# Patient Record
Sex: FEMALE | Race: BLACK OR AFRICAN AMERICAN
[De-identification: names, ages, dates, MRNs, and addresses within clinical notes are randomized per-mention and may not be internally consistent; named-entity substitution may affect disease eponyms.]

---

## 2020-08-27 ENCOUNTER — APPOINTMENT (OUTPATIENT)
Dept: COLORECTAL SURGERY | Facility: CLINIC | Age: 60
End: 2020-08-27
Payer: MEDICAID

## 2020-08-27 VITALS
DIASTOLIC BLOOD PRESSURE: 63 MMHG | SYSTOLIC BLOOD PRESSURE: 92 MMHG | HEART RATE: 80 BPM | TEMPERATURE: 96.9 F | BODY MASS INDEX: 28.58 KG/M2 | WEIGHT: 193 LBS | HEIGHT: 69 IN

## 2020-08-27 VITALS — DIASTOLIC BLOOD PRESSURE: 78 MMHG | SYSTOLIC BLOOD PRESSURE: 128 MMHG | HEART RATE: 78 BPM

## 2020-08-27 DIAGNOSIS — E78.00 PURE HYPERCHOLESTEROLEMIA, UNSPECIFIED: ICD-10-CM

## 2020-08-27 DIAGNOSIS — Z72.3 LACK OF PHYSICAL EXERCISE: ICD-10-CM

## 2020-08-27 DIAGNOSIS — I10 ESSENTIAL (PRIMARY) HYPERTENSION: ICD-10-CM

## 2020-08-27 DIAGNOSIS — E11.9 TYPE 2 DIABETES MELLITUS W/OUT COMPLICATIONS: ICD-10-CM

## 2020-08-27 DIAGNOSIS — Z82.0 FAMILY HISTORY OF EPILEPSY AND OTHER DISEASES OF THE NERVOUS SYSTEM: ICD-10-CM

## 2020-08-27 PROCEDURE — 99203 OFFICE O/P NEW LOW 30 MIN: CPT | Mod: 25

## 2020-08-27 RX ORDER — GABAPENTIN 300 MG
300 TABLET ORAL
Refills: 0 | Status: ACTIVE | COMMUNITY

## 2020-08-27 RX ORDER — BISACODYL 5 MG/1
5 TABLET ORAL
Refills: 0 | Status: ACTIVE | COMMUNITY

## 2020-08-27 RX ORDER — LISINOPRIL 40 MG/1
40 TABLET ORAL
Refills: 0 | Status: ACTIVE | COMMUNITY

## 2020-08-27 RX ORDER — METFORMIN HYDROCHLORIDE 1000 MG/1
1000 TABLET, COATED ORAL
Refills: 0 | Status: ACTIVE | COMMUNITY

## 2020-08-27 RX ORDER — FLUTICASONE PROPIONATE 50 MCG
50 SPRAY, SUSPENSION NASAL
Refills: 0 | Status: ACTIVE | COMMUNITY

## 2020-08-27 RX ORDER — MUPIROCIN 20 MG/G
2 OINTMENT TOPICAL
Refills: 0 | Status: ACTIVE | COMMUNITY

## 2020-08-27 RX ORDER — INSULIN DETEMIR 100 [IU]/ML
100 INJECTION, SOLUTION SUBCUTANEOUS
Refills: 0 | Status: ACTIVE | COMMUNITY

## 2020-08-27 RX ORDER — INSULIN GLARGINE 100 [IU]/ML
100 INJECTION, SOLUTION SUBCUTANEOUS
Refills: 0 | Status: ACTIVE | COMMUNITY

## 2020-08-27 RX ORDER — ATOVAQUONE AND PROGUANIL HYDROCHLORIDE 250; 100 MG/1; MG/1
250-100 TABLET, FILM COATED ORAL
Refills: 0 | Status: ACTIVE | COMMUNITY

## 2020-08-27 RX ORDER — ALBUTEROL SULFATE 90 UG/1
108 (90 BASE) AEROSOL, METERED RESPIRATORY (INHALATION)
Refills: 0 | Status: ACTIVE | COMMUNITY

## 2020-08-27 RX ORDER — ESOMEPRAZOLE MAGNESIUM 40 MG/1
40 CAPSULE, DELAYED RELEASE ORAL
Refills: 0 | Status: ACTIVE | COMMUNITY

## 2020-08-27 RX ORDER — KETOTIFEN FUMARATE 0.35 MG/ML
0.03 SOLUTION/ DROPS OPHTHALMIC
Refills: 0 | Status: ACTIVE | COMMUNITY

## 2020-08-27 RX ORDER — EMPAGLIFLOZIN 10 MG/1
10 TABLET, FILM COATED ORAL
Refills: 0 | Status: ACTIVE | COMMUNITY

## 2020-08-27 RX ORDER — OMEPRAZOLE 20 MG/1
20 CAPSULE, DELAYED RELEASE ORAL
Refills: 0 | Status: ACTIVE | COMMUNITY

## 2020-08-27 RX ORDER — ATORVASTATIN CALCIUM 80 MG/1
80 TABLET, FILM COATED ORAL
Refills: 0 | Status: ACTIVE | COMMUNITY

## 2020-08-27 RX ORDER — POLYVINYL ALCOHOL 14 MG/ML
1.4 SOLUTION/ DROPS OPHTHALMIC
Refills: 0 | Status: ACTIVE | COMMUNITY

## 2020-08-27 NOTE — REVIEW OF SYSTEMS
[Arthralgias] : arthralgias [As Noted in HPI] : as noted in HPI [Joint Pain] : joint pain [Sleep Disturbances] : sleep disturbances [Negative] : Heme/Lymph [FreeTextEntry9] : muscle weakness/knee

## 2020-08-27 NOTE — ASSESSMENT
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

## 2020-08-27 NOTE — HISTORY OF PRESENT ILLNESS
[FreeTextEntry1] : 61 yo [place] speaking F presents with daughter for evaluation of hemorrhoids, referred by PCP Dr. Lulu Groves\par \par Of note, patient c/o of lightheadedness and has low BP today. Typically systolic of 120s. Last took Lisinopril yesterday\par Reports has only slept 2 hours for the last 3 days as she's worried regarding hemorrhoids\par Only drank 1 cup of tea yesterday. Reports appetite good. Denies CP or palpitations or headaches\par Provided water, patient reported resolution of lightheadedness.  Rechecked BP and normalized 128/78\par \par Hx of chronic constipation\par c/o hemorrhoids since 7/21 which she attributes to constipation and straining\par c/o heavy pressure and burning pain during and after BM worsened with sitting. Patient reduced food intake for fear of pain during BM and reports some weight loss\par hx of BRBPR noted on TP during initial presentation\par Continues to have some discomfort while sitting\par Denies itching\par Seen by PCP on 7/28, started on hydrocortisone 2.5% BID, last used one week ago. Also called LEOPOLDO Arizmendi who started on anucort suppositories w/ improvement (last seen in office 1 year ago).\par Occasionally takes bath\par \par Hx of Metoclopramide prescribed by LEOPOLDO Arizmendi, but patient is not taking at present\par Also hx of of omeprazole, but PCP recently changed to Nexium\par \par BH: every 2 days w/ straining\par Reports adequate intake of dietary fiber and water\par Takes bisacodyl 5 mg 2 tablets daily for several years\par Denies fiber supplement use\par Last colonoscopy/EGD approximately 5 years ago w/ Dr. Arizmendi, normal as per patient\par Denies FMH colorectal CA\par Takes ASA 81 mg daily. Denies NSAID use\par

## 2020-08-27 NOTE — PHYSICAL EXAM
[FreeTextEntry1] : Medical assistant present for duration of physical examination\par \par Gen no acute distress, alert and oriented\par Psych calm, pleasant demeanor, responding appropriately to question\par Nonlabored breathing\par Ambulating without assistance\par Skin normal color and pigment, no visible lesions or rashes\par \par Anorectal Exam:\par Inspection no erythema, induration or fluctuance, anterior midline anal fissure\par SONAL tender, no masses palpated, no blood on gloved finger, elevated tone at rest\par Anoscopy recommended however patient unable to tolerate due to pain\par

## 2020-09-16 ENCOUNTER — APPOINTMENT (OUTPATIENT)
Dept: GASTROENTEROLOGY | Facility: CLINIC | Age: 60
End: 2020-09-16
Payer: MEDICAID

## 2020-09-16 VITALS
WEIGHT: 196 LBS | SYSTOLIC BLOOD PRESSURE: 140 MMHG | DIASTOLIC BLOOD PRESSURE: 90 MMHG | TEMPERATURE: 99.7 F | HEART RATE: 95 BPM | RESPIRATION RATE: 16 BRPM | HEIGHT: 69 IN | BODY MASS INDEX: 29.03 KG/M2 | OXYGEN SATURATION: 98 %

## 2020-09-16 DIAGNOSIS — Z00.00 ENCOUNTER FOR GENERAL ADULT MEDICAL EXAMINATION W/OUT ABNORMAL FINDINGS: ICD-10-CM

## 2020-09-16 PROCEDURE — 99205 OFFICE O/P NEW HI 60 MIN: CPT

## 2020-09-16 RX ORDER — METOCLOPRAMIDE 5 MG/1
5 TABLET ORAL
Refills: 0 | Status: DISCONTINUED | COMMUNITY
End: 2020-09-16

## 2020-09-17 LAB
ALBUMIN SERPL ELPH-MCNC: 4.5 G/DL
ALP BLD-CCNC: 67 U/L
ALT SERPL-CCNC: 15 U/L
ANION GAP SERPL CALC-SCNC: 12 MMOL/L
AST SERPL-CCNC: 16 U/L
BASOPHILS # BLD AUTO: 0.01 K/UL
BASOPHILS NFR BLD AUTO: 0.2 %
BILIRUB SERPL-MCNC: 0.2 MG/DL
BUN SERPL-MCNC: 12 MG/DL
CALCIUM SERPL-MCNC: 9.4 MG/DL
CHLORIDE SERPL-SCNC: 102 MMOL/L
CO2 SERPL-SCNC: 25 MMOL/L
CREAT SERPL-MCNC: 0.77 MG/DL
EOSINOPHIL # BLD AUTO: 0.06 K/UL
EOSINOPHIL NFR BLD AUTO: 1.4 %
GLUCOSE SERPL-MCNC: 215 MG/DL
HCT VFR BLD CALC: 39.5 %
HGB BLD-MCNC: 12.1 G/DL
IMM GRANULOCYTES NFR BLD AUTO: 0.2 %
LYMPHOCYTES # BLD AUTO: 1.95 K/UL
LYMPHOCYTES NFR BLD AUTO: 44.1 %
MAN DIFF?: NORMAL
MCHC RBC-ENTMCNC: 30.3 PG
MCHC RBC-ENTMCNC: 30.6 GM/DL
MCV RBC AUTO: 98.8 FL
MONOCYTES # BLD AUTO: 0.35 K/UL
MONOCYTES NFR BLD AUTO: 7.9 %
NEUTROPHILS # BLD AUTO: 2.04 K/UL
NEUTROPHILS NFR BLD AUTO: 46.2 %
PLATELET # BLD AUTO: 238 K/UL
POTASSIUM SERPL-SCNC: 4.4 MMOL/L
PROT SERPL-MCNC: 7.5 G/DL
RBC # BLD: 4 M/UL
RBC # FLD: 14.1 %
SODIUM SERPL-SCNC: 139 MMOL/L
TSH SERPL-ACNC: 1.41 UIU/ML
WBC # FLD AUTO: 4.42 K/UL

## 2020-09-18 NOTE — HISTORY OF PRESENT ILLNESS
[de-identified] : 60 Y F, referred by Dr. Herrera for chronic constipation x 10 years. Speaks [place] ([place] ), daughter translated as per patient preference. \par \par PMHx: diabetes and HTN\par PSHx: "neck nerve" surgery\par \par Reports constipation for the last 8 years, unprovoked. No change in lifestyle habits. Has had 3 children via vaginal birth. Denies abdominal pain. Taking dulcolax 5 days out of 7 weekly and dulcolax suppository on occasion. Using diltiazem for anal fissure with improvement. Continues to see blood in stool. Pain but overall less painful than previously. \par \par Last cscope 8 years ago with Dr. Arizmendi. Reported hemorrhoids at the time. No polyps. \par \par Fam hx: no CRC\par Social hx: No alcohol use, no drug use or tobacco use. \par \par Work: Previously did vending for restaurants and also sows.

## 2020-09-18 NOTE — PHYSICAL EXAM
[General Appearance - Alert] : alert [General Appearance - In No Acute Distress] : in no acute distress [General Appearance - Well Nourished] : well nourished [General Appearance - Well Developed] : well developed [Neck Appearance] : the appearance of the neck was normal [Respiration, Rhythm And Depth] : normal respiratory rhythm and effort [Exaggerated Use Of Accessory Muscles For Inspiration] : no accessory muscle use [Bowel Sounds] : normal bowel sounds [Abdomen Soft] : soft [Abdomen Tenderness] : non-tender [Abnormal Walk] : normal gait [Skin Color & Pigmentation] : normal skin color and pigmentation [] : no rash [Skin Lesions] : no skin lesions [Oriented To Time, Place, And Person] : oriented to person, place, and time [Affect] : the affect was normal [Mood] : the mood was normal [Sclera] : the sclera and conjunctiva were normal [Outer Ear] : the ears and nose were normal in appearance [Cervical Lymph Nodes Enlarged Posterior Bilaterally] : posterior cervical [Cervical Lymph Nodes Enlarged Anterior Bilaterally] : anterior cervical

## 2020-09-18 NOTE — CONSULT LETTER
[Dear  ___] : Dear  [unfilled], [Courtesy Letter:] : I had the pleasure of seeing your patient, [unfilled], in my office today. [Please see my note below.] : Please see my note below. [Sincerely,] : Sincerely, [FreeTextEntry3] : Jose Bar MD\par Associate Professor of Medicine\par Director IBD Program\par [facility]\par

## 2020-09-18 NOTE — ASSESSMENT
[FreeTextEntry1] : 60 Y F, hx HTN, diabetes, and constipation x 8 years, referred by Dr. Herrera for medical management. \par \par Chronic Constipation\par - okay to continue using Dulcolax given relief, however advised she can transition to Linzess 145mcg daily 30 minutes before meals which may be more effective as maintenance therapy\par - continue inc fiber and water, and inc exercise\par - if no relief with Lizness, consider ARM once fissure heals\par \par Anal fissure\par - we discussed importance of treating constipation to prevent recurrent fissures\par - cont diltiazem given relief, but it's been 2mo without sig benefit.\par - they were supposed to see Dr Herrera in f/u and I've asked them to jaime that.\par \par HCM\par - cscope due in 2 years as per recommendations from previous cscope (no report available)\par \par F/U 3 months with NP \par \par Marisa Elena NP

## 2020-10-27 ENCOUNTER — APPOINTMENT (OUTPATIENT)
Dept: COLORECTAL SURGERY | Facility: CLINIC | Age: 60
End: 2020-10-27
Payer: MEDICAID

## 2020-10-27 VITALS
SYSTOLIC BLOOD PRESSURE: 123 MMHG | BODY MASS INDEX: 29.33 KG/M2 | HEART RATE: 92 BPM | HEIGHT: 69 IN | TEMPERATURE: 97.4 F | DIASTOLIC BLOOD PRESSURE: 79 MMHG | WEIGHT: 198 LBS

## 2020-10-27 DIAGNOSIS — K59.00 CONSTIPATION, UNSPECIFIED: ICD-10-CM

## 2020-10-27 DIAGNOSIS — K60.2 ANAL FISSURE, UNSPECIFIED: ICD-10-CM

## 2020-10-27 PROCEDURE — 99213 OFFICE O/P EST LOW 20 MIN: CPT

## 2020-10-27 PROCEDURE — 99072 ADDL SUPL MATRL&STAF TM PHE: CPT

## 2020-10-27 NOTE — PHYSICAL EXAM
[FreeTextEntry1] : Medical assistant present for duration of physical examination\par \par Gen no acute distress, alert and oriented\par Psych calm, pleasant \par Nonlabored breathing\par Ambulating without assistance\par Skin normal color and pigment, no visible lesions or rashes\par \par Anorectal Exam:\par Inspection external portion of previously noted anterior midline anal fissure has healed - no visible external component to fissure\par SONAL focal tendernes anteriorly, no masses palpated, no blood on gloved finger, elevated tone at rest\par Anoscopy attempted however patient unable to tolerate due to pain\par

## 2020-10-27 NOTE — ASSESSMENT
[FreeTextEntry1] : Exam findings and diagnosis were discussed at length with patient and daughter who served as  per patient request.\par Clinical exam improving, however patient still reports symptoms consistent with active fissure.\par Recommend continued medical management of constipation - recommend patient f/u with GI to discuss their prior recommendation for Linzess.\par Risk of continued/recurrent anal fissure discussed.\par Discussed alternative management options for anal fissure - Discussed EUA, botox injection to chemical denervate sphincter.\par Patient states she wants to continue with topical compound cream and reassess her symptoms.\par Recommend f/u 1 in month or sooner as needed \par All questions were answered, patient expressed understanding, and is agreeable to this plan.\par

## 2020-10-27 NOTE — HISTORY OF PRESENT ILLNESS
[FreeTextEntry1] : 59 y/o [place] speaking (daughter translating) F presents for f/u anal fissure\par Last seen 8/27/20, anterior midline anal fissure noted on exam. Prescribed Diltiazem ointment, using as prescribed with improvement in symptoms initially. \par C/o intermittent burning and tearing pain with some but not all BM's. Pain can last for up to 3 hours. When pain begins she takes Gabapentin and Nexium stating the Nexium reduces pain\par Denies bleeding or itching\par Was doing sitz baths but d/c'd due to vaginal irritation \par BH:Daily but needs to strain\par H/o chronic constipation, evaluated by GI 9/16/20, was prescribed Linzess 145 mcg but required a prior authorization, has not been in contact with GI to discuss. \par Patient using Miralax PRN, using ~ 2 times per week  \par Last colonoscopy completed 5 years ago, no abnormal findings per patient\par Taking 81 mg ASA daily

## 2020-12-03 ENCOUNTER — APPOINTMENT (OUTPATIENT)
Dept: COLORECTAL SURGERY | Facility: CLINIC | Age: 60
End: 2020-12-03

## 2020-12-04 ENCOUNTER — APPOINTMENT (OUTPATIENT)
Dept: GASTROENTEROLOGY | Facility: CLINIC | Age: 60
End: 2020-12-04